# Patient Record
Sex: FEMALE | Race: WHITE | NOT HISPANIC OR LATINO | URBAN - METROPOLITAN AREA
[De-identification: names, ages, dates, MRNs, and addresses within clinical notes are randomized per-mention and may not be internally consistent; named-entity substitution may affect disease eponyms.]

---

## 2018-01-16 ENCOUNTER — HOSPITAL ENCOUNTER (EMERGENCY)
Facility: HOSPITAL | Age: 28
Discharge: HOME | End: 2018-01-16
Attending: EMERGENCY MEDICINE
Payer: MEDICAID

## 2018-01-16 VITALS
WEIGHT: 140 LBS | SYSTOLIC BLOOD PRESSURE: 114 MMHG | HEIGHT: 70 IN | RESPIRATION RATE: 20 BRPM | TEMPERATURE: 98 F | BODY MASS INDEX: 20.04 KG/M2 | DIASTOLIC BLOOD PRESSURE: 58 MMHG | OXYGEN SATURATION: 100 % | HEART RATE: 63 BPM

## 2018-01-16 DIAGNOSIS — S09.90XA INJURY OF HEAD, INITIAL ENCOUNTER: Primary | ICD-10-CM

## 2018-01-16 LAB
B-HCG UR QL: NEGATIVE
CTP QC/QA: YES

## 2018-01-16 PROCEDURE — 96372 THER/PROPH/DIAG INJ SC/IM: CPT

## 2018-01-16 PROCEDURE — 63600175 PHARM REV CODE 636 W HCPCS: Performed by: PHYSICIAN ASSISTANT

## 2018-01-16 PROCEDURE — 99283 EMERGENCY DEPT VISIT LOW MDM: CPT | Mod: 25

## 2018-01-16 PROCEDURE — 99283 EMERGENCY DEPT VISIT LOW MDM: CPT | Mod: ,,, | Performed by: PHYSICIAN ASSISTANT

## 2018-01-16 PROCEDURE — 81025 URINE PREGNANCY TEST: CPT | Performed by: EMERGENCY MEDICINE

## 2018-01-16 RX ORDER — NAPROXEN 500 MG/1
500 TABLET ORAL 2 TIMES DAILY WITH MEALS
Qty: 30 TABLET | Refills: 0 | Status: SHIPPED | OUTPATIENT
Start: 2018-01-16

## 2018-01-16 RX ORDER — KETOROLAC TROMETHAMINE 30 MG/ML
15 INJECTION, SOLUTION INTRAMUSCULAR; INTRAVENOUS
Status: COMPLETED | OUTPATIENT
Start: 2018-01-16 | End: 2018-01-16

## 2018-01-16 RX ADMIN — KETOROLAC TROMETHAMINE 15 MG: 30 INJECTION, SOLUTION INTRAMUSCULAR at 04:01

## 2018-01-16 NOTE — ED PROVIDER NOTES
Encounter Date: 1/16/2018       History     Chief Complaint   Patient presents with    Head Injury     last week my toddler hit heads with me over my L eye and hurting more now, denies loc     27 year old female with medical history of migraines presenting to the ED with the chief complaint of a head injury. Patient reports her 4 year old daughter (~35 pounds) fell backwards and hit the back of her head against the patient's left forehead. Patient reports having constant aching pain over her left forehead and left eyebrow since the incident. She reports the pain has progressively worsened which is why she presented today. She denies LOC and blood thinner use. She reports taking OTC Advil 2 days ago for the pain and experienced mild relief. She has associated photosensitivity. She reports developing left arm weakness 2 days ago. She reports having a history of migraines that are typically located in her temples bilaterally. She denies fever, chills, vision changes, balance abnormalities, numbness, paresthesias, gait changes, neck pain, back pain, congestion, sore throat, chest pain, shortness of breath, abdominal pain, nausea, vomiting, dysuria, hematuria, diarrhea, constipation.           Review of patient's allergies indicates:  No Known Allergies  Past Medical History:   Diagnosis Date    Migraine headache      History reviewed. No pertinent surgical history.  History reviewed. No pertinent family history.  Social History   Substance Use Topics    Smoking status: Current Every Day Smoker     Packs/day: 1.00     Types: Cigarettes    Smokeless tobacco: Never Used    Alcohol use No     Review of Systems   Constitutional: Negative for chills and fever.   HENT: Negative for congestion, sore throat and trouble swallowing.    Eyes: Positive for photophobia. Negative for pain, redness and visual disturbance.   Respiratory: Negative for cough and shortness of breath.    Cardiovascular: Negative for chest pain.    Gastrointestinal: Negative for abdominal pain, anal bleeding, diarrhea, nausea and vomiting.   Genitourinary: Negative for dysuria and hematuria.   Musculoskeletal: Negative for back pain, neck pain and neck stiffness.   Skin: Negative for rash and wound.   Neurological: Positive for weakness (left arm) and headaches. Negative for seizures, light-headedness and numbness.       Physical Exam     Initial Vitals [01/16/18 1135]   BP Pulse Resp Temp SpO2   (!) 156/65 97 16 97.7 °F (36.5 °C) 100 %      MAP       95.33         Physical Exam    Constitutional: She appears well-developed and well-nourished. She is not diaphoretic. No distress.   HENT:   Head:       Mouth/Throat: Oropharynx is clear and moist. No oropharyngeal exudate.   The bilaterally orbital ridges appear symmetric. No edema, erythema, or ecchymosis.    Eyes: Conjunctivae and EOM are normal. Pupils are equal, round, and reactive to light.   Neck: Normal range of motion. Neck supple.   No midline cervical tenderness   Cardiovascular: Normal rate and regular rhythm.   Pulmonary/Chest: Breath sounds normal. No respiratory distress. She has no wheezes.   Abdominal: Soft. Bowel sounds are normal. She exhibits no distension. There is no tenderness.   Musculoskeletal: Normal range of motion. She exhibits no edema or tenderness.   Lymphadenopathy:     She has no cervical adenopathy.   Neurological: She is alert and oriented to person, place, and time. She has normal strength and normal reflexes. No cranial nerve deficit or sensory deficit.   Negative Romberg and Cerebellar alternating functioning test   Skin: Skin is warm and dry. No erythema.         ED Course   Procedures  Labs Reviewed   POCT URINE PREGNANCY                   APC / Resident Notes:   27 year old female with medical history of migraines presenting to the ED c/o head injury 1 week ago. DDx includes but not limited to contusion, migraines, fracture, corneal abrasion, cerebrovascular  disease.    Patient is neurovascularly intact and has no calvarium deformities on exam. I do not suspect intracranial processes or fracture at this time. Patient most likely 2/2 to a contusion. Will give Toradol in the ED. Will discharge with Naprosyn for further pain control. Patient advised to follow-up with PCP regarding symptoms if they continue. Return to ED precautions given. All of the patient's questions were answered. I have discussed the care of this patient with my supervising physician.                 ED Course      Clinical Impression:   The encounter diagnosis was Injury of head, initial encounter.    Disposition:   Disposition: Discharged  Condition: Stable                        Zain Brown PA-C  01/16/18 [id number]

## 2018-01-16 NOTE — ED NOTES
Patient identifiers verified and correct for Ms Gamez  C/C: Head Injury, headache  APPEARANCE: awake and alert in NAD.  SKIN: warm, dry and intact. No breakdown or bruising.  MUSCULOSKELETAL: Patient moving all extremities spontaneously, no obvious swelling or deformities noted. Ambulates independently.  RESPIRATORY: Denies shortness of breath.Respirations unlabored.   CARDIAC: Denies CP, 2+ distal pulses; no peripheral edema  ABDOMEN: S/ND/NT, Denies nausea  : voids spontaneously, denies difficulty  Neurologic: AAO x 4; follows commands equal strength in all extremities; denies numbness/tingling. Denies dizziness Positive weakness, no abrasion above eyebrow, pain to left inner eyebrow, headache

## 2018-01-16 NOTE — ED TRIAGE NOTES
Patient states the back of her toddlers head hit her left eyebrow with continued pain near eyebrow. Denies LOC.  Positive headache, no nausea.  States no OTC pain meds x 2 days